# Patient Record
Sex: FEMALE | Race: WHITE | NOT HISPANIC OR LATINO | URBAN - METROPOLITAN AREA
[De-identification: names, ages, dates, MRNs, and addresses within clinical notes are randomized per-mention and may not be internally consistent; named-entity substitution may affect disease eponyms.]

---

## 2017-12-22 ENCOUNTER — EMERGENCY (EMERGENCY)
Facility: HOSPITAL | Age: 27
LOS: 0 days | Discharge: HOME | End: 2017-12-22
Attending: EMERGENCY MEDICINE | Admitting: EMERGENCY MEDICINE
Payer: MEDICAID

## 2017-12-22 ENCOUNTER — RESULT REVIEW (OUTPATIENT)
Age: 27
End: 2017-12-22

## 2017-12-22 VITALS
OXYGEN SATURATION: 100 % | SYSTOLIC BLOOD PRESSURE: 123 MMHG | RESPIRATION RATE: 17 BRPM | DIASTOLIC BLOOD PRESSURE: 65 MMHG | HEART RATE: 91 BPM

## 2017-12-22 VITALS
DIASTOLIC BLOOD PRESSURE: 69 MMHG | HEART RATE: 84 BPM | SYSTOLIC BLOOD PRESSURE: 112 MMHG | TEMPERATURE: 98 F | RESPIRATION RATE: 16 BRPM | OXYGEN SATURATION: 100 % | HEIGHT: 66 IN | WEIGHT: 139.99 LBS

## 2017-12-22 DIAGNOSIS — O03.9 COMPLETE OR UNSPECIFIED SPONTANEOUS ABORTION WITHOUT COMPLICATION: ICD-10-CM

## 2017-12-22 LAB
ABO RH CONFIRMATION: SIGNIFICANT CHANGE UP
ALBUMIN SERPL ELPH-MCNC: 3.4 G/DL — SIGNIFICANT CHANGE UP (ref 3.3–5)
ALP SERPL-CCNC: 52 U/L — SIGNIFICANT CHANGE UP (ref 40–120)
ALT FLD-CCNC: 21 U/L — SIGNIFICANT CHANGE UP (ref 12–78)
ANION GAP SERPL CALC-SCNC: 7 MMOL/L — SIGNIFICANT CHANGE UP (ref 5–17)
APPEARANCE UR: CLEAR — SIGNIFICANT CHANGE UP
APTT BLD: 31.9 SEC — SIGNIFICANT CHANGE UP (ref 27.5–37.4)
AST SERPL-CCNC: 20 U/L — SIGNIFICANT CHANGE UP (ref 15–37)
BACTERIA # UR AUTO: (no result)
BASOPHILS # BLD AUTO: 0.1 K/UL — SIGNIFICANT CHANGE UP (ref 0–0.2)
BASOPHILS NFR BLD AUTO: 1.2 % — SIGNIFICANT CHANGE UP (ref 0–2)
BILIRUB SERPL-MCNC: 0.2 MG/DL — SIGNIFICANT CHANGE UP (ref 0.2–1.2)
BILIRUB UR-MCNC: NEGATIVE — SIGNIFICANT CHANGE UP
BLD GP AB SCN SERPL QL: SIGNIFICANT CHANGE UP
BUN SERPL-MCNC: 11 MG/DL — SIGNIFICANT CHANGE UP (ref 7–23)
CALCIUM SERPL-MCNC: 9.3 MG/DL — SIGNIFICANT CHANGE UP (ref 8.5–10.1)
CHLORIDE SERPL-SCNC: 105 MMOL/L — SIGNIFICANT CHANGE UP (ref 96–108)
CO2 SERPL-SCNC: 28 MMOL/L — SIGNIFICANT CHANGE UP (ref 22–31)
COLOR SPEC: YELLOW — SIGNIFICANT CHANGE UP
CREAT SERPL-MCNC: 0.65 MG/DL — SIGNIFICANT CHANGE UP (ref 0.5–1.3)
DIFF PNL FLD: (no result)
EOSINOPHIL # BLD AUTO: 0.3 K/UL — SIGNIFICANT CHANGE UP (ref 0–0.5)
EOSINOPHIL NFR BLD AUTO: 3.2 % — SIGNIFICANT CHANGE UP (ref 0–6)
EPI CELLS # UR: (no result)
GLUCOSE SERPL-MCNC: 80 MG/DL — SIGNIFICANT CHANGE UP (ref 70–99)
GLUCOSE UR QL: NEGATIVE MG/DL — SIGNIFICANT CHANGE UP
HCG SERPL-ACNC: 4921 MIU/ML — SIGNIFICANT CHANGE UP
HCT VFR BLD CALC: 39.4 % — SIGNIFICANT CHANGE UP (ref 34.5–45)
HGB BLD-MCNC: 13.1 G/DL — SIGNIFICANT CHANGE UP (ref 11.5–15.5)
INR BLD: 1 RATIO — SIGNIFICANT CHANGE UP (ref 0.88–1.16)
KETONES UR-MCNC: NEGATIVE — SIGNIFICANT CHANGE UP
LEUKOCYTE ESTERASE UR-ACNC: (no result)
LYMPHOCYTES # BLD AUTO: 1.8 K/UL — SIGNIFICANT CHANGE UP (ref 1–3.3)
LYMPHOCYTES # BLD AUTO: 21.8 % — SIGNIFICANT CHANGE UP (ref 13–44)
MCHC RBC-ENTMCNC: 31.1 PG — SIGNIFICANT CHANGE UP (ref 27–34)
MCHC RBC-ENTMCNC: 33.3 GM/DL — SIGNIFICANT CHANGE UP (ref 32–36)
MCV RBC AUTO: 93.4 FL — SIGNIFICANT CHANGE UP (ref 80–100)
MONOCYTES # BLD AUTO: 0.8 K/UL — SIGNIFICANT CHANGE UP (ref 0–0.9)
MONOCYTES NFR BLD AUTO: 9.4 % — SIGNIFICANT CHANGE UP (ref 2–14)
NEUTROPHILS # BLD AUTO: 5.4 K/UL — SIGNIFICANT CHANGE UP (ref 1.8–7.4)
NEUTROPHILS NFR BLD AUTO: 64.4 % — SIGNIFICANT CHANGE UP (ref 43–77)
NITRITE UR-MCNC: NEGATIVE — SIGNIFICANT CHANGE UP
PH UR: 7 — SIGNIFICANT CHANGE UP (ref 5–8)
PLATELET # BLD AUTO: 202 K/UL — SIGNIFICANT CHANGE UP (ref 150–400)
POTASSIUM SERPL-MCNC: 4.4 MMOL/L — SIGNIFICANT CHANGE UP (ref 3.5–5.3)
POTASSIUM SERPL-SCNC: 4.4 MMOL/L — SIGNIFICANT CHANGE UP (ref 3.5–5.3)
PROT SERPL-MCNC: 6.8 GM/DL — SIGNIFICANT CHANGE UP (ref 6–8.3)
PROT UR-MCNC: NEGATIVE MG/DL — SIGNIFICANT CHANGE UP
PROTHROM AB SERPL-ACNC: 10.8 SEC — SIGNIFICANT CHANGE UP (ref 9.8–12.7)
RBC # BLD: 4.22 M/UL — SIGNIFICANT CHANGE UP (ref 3.8–5.2)
RBC # FLD: 11.2 % — SIGNIFICANT CHANGE UP (ref 10.3–14.5)
RBC CASTS # UR COMP ASSIST: >50 /HPF (ref 0–4)
SODIUM SERPL-SCNC: 140 MMOL/L — SIGNIFICANT CHANGE UP (ref 135–145)
SP GR SPEC: 1.01 — SIGNIFICANT CHANGE UP (ref 1.01–1.02)
TYPE + AB SCN PNL BLD: SIGNIFICANT CHANGE UP
UROBILINOGEN FLD QL: NEGATIVE MG/DL — SIGNIFICANT CHANGE UP
WBC # BLD: 8.3 K/UL — SIGNIFICANT CHANGE UP (ref 3.8–10.5)
WBC # FLD AUTO: 8.3 K/UL — SIGNIFICANT CHANGE UP (ref 3.8–10.5)
WBC UR QL: SIGNIFICANT CHANGE UP

## 2017-12-22 PROCEDURE — 99285 EMERGENCY DEPT VISIT HI MDM: CPT

## 2017-12-22 PROCEDURE — 88305 TISSUE EXAM BY PATHOLOGIST: CPT | Mod: 26

## 2017-12-22 PROCEDURE — 76856 US EXAM PELVIC COMPLETE: CPT | Mod: 26

## 2017-12-22 RX ORDER — KETOROLAC TROMETHAMINE 30 MG/ML
15 SYRINGE (ML) INJECTION ONCE
Qty: 0 | Refills: 0 | Status: DISCONTINUED | OUTPATIENT
Start: 2017-12-22 | End: 2017-12-22

## 2017-12-22 RX ORDER — ALBUTEROL 90 UG/1
1 AEROSOL, METERED ORAL ONCE
Qty: 0 | Refills: 0 | Status: DISCONTINUED | OUTPATIENT
Start: 2017-12-22 | End: 2017-12-22

## 2017-12-22 RX ORDER — SODIUM CHLORIDE 9 MG/ML
1000 INJECTION INTRAMUSCULAR; INTRAVENOUS; SUBCUTANEOUS ONCE
Qty: 0 | Refills: 0 | Status: COMPLETED | OUTPATIENT
Start: 2017-12-22 | End: 2017-12-22

## 2017-12-22 RX ADMIN — Medication 15 MILLIGRAM(S): at 15:34

## 2017-12-22 RX ADMIN — SODIUM CHLORIDE 2000 MILLILITER(S): 9 INJECTION INTRAMUSCULAR; INTRAVENOUS; SUBCUTANEOUS at 12:16

## 2017-12-22 NOTE — CONSULT NOTE ADULT - PROBLEM SELECTOR RECOMMENDATION 9
-Outpatient ultrasound confirmed 6 wk intrauterine pregnancy  -STAT Pelvic ultrasound showed No evidence of intrauterine gestation. Endometrial lining is slightly thickened.  -Speculum exam showed minimal bleeding with products of conception noted in the internal os. Specimen was obtained and will be sent to pathology. Follow up pathology report.   -Cytotec 800 mcg placed vaginally  -Recommend Toradol IM for cramping  -Patient will follow up outpatient for an ultrasound within 1-2 weeks  -Plan discussed with ED attending, Dr. Wade -Outpatient ultrasound confirmed intrauterine pregnancy  -STAT Pelvic ultrasound showed No evidence of intrauterine gestation. Endometrial lining is slightly thickened.  -Speculum exam showed minimal bleeding with products of conception noted in the internal os. Specimen was obtained and will be sent to pathology. Follow up pathology report.   -Cytotec 800 mcg placed vaginally  -Recommend Toradol IM for cramping  -Patient will follow up outpatient for an ultrasound within 1-2 weeks  -Plan discussed with ED attending, Dr. Wade

## 2017-12-22 NOTE — ED ADULT TRIAGE NOTE - CHIEF COMPLAINT QUOTE
Pt. to the ED BIBA C/O Vaginal Bleeding- Pt. states she is bleeding heavily w clots and abdominal - Hx. of recent kidney infection

## 2017-12-22 NOTE — CONSULT NOTE ADULT - SUBJECTIVE AND OBJECTIVE BOX
26 yo F,  with no significant PMHx presented to the ED with vaginal bleeding x 1 day. The patient states that she was 6 weeks pregnant with an ultrasound confirmation performed by [facility] a few days prior. The patient is complaining of abdominal cramping and vaginal bleeding with clots. Denies weakness, headache, dizziness, chest pain, palpitations, SOB, N/V/D/C, hematuria, and melena. The patient has not established with an Ob. Admits to smoking cigarettes during pregnancy. Denies EtOH and recreational drugs. 26 yo F,  with no significant PMHx presented to the ED with vaginal bleeding x 1 day. The patient states that she was 6 weeks pregnant with an ultrasound confirmation performed by [facility] a few days prior. The patient is complaining of abdominal cramping and vaginal bleeding with clots. Denies weakness, headache, dizziness, chest pain, palpitations, SOB, N/V/D/C, hematuria, and melena. The patient has not established with an Ob. Admits to smoking cigarettes during pregnancy. Denies EtOH and recreational drugs.     REVIEW OF SYSTEMS:  CONSTITUTIONAL: No fever  RESPIRATORY: No cough, wheezing, chills or hemoptysis; No shortness of breath  CARDIOVASCULAR: No chest pain, palpitations, dizziness, or leg swelling  GASTROINTESTINAL: No pigastric pain. No nausea, vomiting, or hematemesis; No diarrhea or constipation. No melena or hematochezia; Admits to lower abdominal cramping  GENITOURINARY: No dysuria, frequency, hematuria, or incontinence  NEUROLOGICAL: No headaches    Vital Signs Last 24 Hrs  T(C): 36.4 (22 Dec 2017 11:06), Max: 36.4 (22 Dec 2017 11:06)  T(F): 97.6 (22 Dec 2017 11:06), Max: 97.6 (22 Dec 2017 11:06)  HR: 84 (22 Dec 2017 11:06) (84 - 84)  BP: 112/69 (22 Dec 2017 11:06) (112/69 - 112/69)  BP(mean): --  RR: 16 (22 Dec 2017 11:06) (16 - 16)  SpO2: 100% (22 Dec 2017 11:06) (100% - 100%)    PHYSICAL EXAM:  GENERAL: NAD, well-groomed, well-developed  HEAD:  Atraumatic, Normocephalic  ENMT: Moist mucous membranes  NERVOUS SYSTEM:  Alert & Oriented X3, Good concentration  CHEST/LUNG: Clear to auscultation bilaterally; No rales, rhonchi, wheezing, or rubs  HEART: Regular rate and rhythm; No murmurs, rubs, or gallops  ABDOMEN: Soft, Nontender, Nondistended; Bowel sounds present  EXTREMITIES:  2+ Peripheral Pulses, No clubbing, cyanosis, or edema  PELVIC: Speculum exam performed. Minimal bleeding, POC found in internal os    < from: US Pelvis Complete (17 @ 13:15) >  The uterus is 9.67 x 5.29 x 5.48 cm in diameters. Endometrial stripe is   minimally thickened, 14 mm in thickness. There is no evidence of   intrauterine gestation.  The cervix is unremarkable.    The right ovary is3 x 0.9 x 2.2 cm in diameters and shows normal flow by   Doppler.  The left ovary is 2.7 x 1.8 x 2.2 cm in diameters and shows normal flow   by Doppler.    No free pelvic fluid.    Impression: No evidence of intrauterine gestation. Endometrial liningis   slightly thickened.  The ovaries are unremarkable.  Ectopic pregnancy is not entirely excluded  and clinical correlation is   recommended.      A/P: 28 yo F,  with no significant PMHx presented to the ED with vaginal bleeding x 1 day. The patient states that she was 6 weeks pregnant with an ultrasound confirmation performed by [facility] a few days prior. The patient is complaining of abdominal cramping and vaginal bleeding with clots. Denies weakness, headache, dizziness, chest pain, palpitations, SOB, N/V/D/C, hematuria, and melena. The patient has not established with an Ob. Admits to smoking cigarettes during pregnancy. Denies EtOH and recreational drugs.     REVIEW OF SYSTEMS:  CONSTITUTIONAL: No fever  RESPIRATORY: No cough, wheezing, chills or hemoptysis; No shortness of breath  CARDIOVASCULAR: No chest pain, palpitations, dizziness, or leg swelling  GASTROINTESTINAL: No pigastric pain. No nausea, vomiting, or hematemesis; No diarrhea or constipation. No melena or hematochezia; Admits to lower abdominal cramping  GENITOURINARY: No dysuria, frequency, hematuria, or incontinence  NEUROLOGICAL: No headaches    Vital Signs Last 24 Hrs  T(C): 36.4 (22 Dec 2017 11:06), Max: 36.4 (22 Dec 2017 11:06)  T(F): 97.6 (22 Dec 2017 11:06), Max: 97.6 (22 Dec 2017 11:06)  HR: 84 (22 Dec 2017 11:06) (84 - 84)  BP: 112/69 (22 Dec 2017 11:06) (112/69 - 112/69)  BP(mean): --  RR: 16 (22 Dec 2017 11:06) (16 - 16)  SpO2: 100% (22 Dec 2017 11:06) (100% - 100%)    PHYSICAL EXAM:  GENERAL: NAD, well-groomed, well-developed  HEAD:  Atraumatic, Normocephalic  ENMT: Moist mucous membranes  NERVOUS SYSTEM:  Alert & Oriented X3, Good concentration  CHEST/LUNG: Clear to auscultation bilaterally; No rales, rhonchi, wheezing, or rubs  HEART: Regular rate and rhythm; No murmurs, rubs, or gallops  ABDOMEN: Soft, Nontender, Nondistended; Bowel sounds present  EXTREMITIES:  2+ Peripheral Pulses, No clubbing, cyanosis, or edema  PELVIC: Speculum exam performed. Minimal bleeding, POC found in internal os    < from: US Pelvis Complete (17 @ 13:15) >  The uterus is 9.67 x 5.29 x 5.48 cm in diameters. Endometrial stripe is   minimally thickened, 14 mm in thickness. There is no evidence of   intrauterine gestation.  The cervix is unremarkable.    The right ovary is3 x 0.9 x 2.2 cm in diameters and shows normal flow by   Doppler.  The left ovary is 2.7 x 1.8 x 2.2 cm in diameters and shows normal flow   by Doppler.    No free pelvic fluid.    Impression: No evidence of intrauterine gestation. Endometrial liningis   slightly thickened.  The ovaries are unremarkable.  Ectopic pregnancy is not entirely excluded  and clinical correlation is   recommended.

## 2017-12-22 NOTE — CONSULT NOTE ADULT - ATTENDING COMMENTS
ATTENDING NOTE  Patient was seen and examined. 28 yo P2 s/p SAB at 6 weeks patient stable for discharge.   I agree with  above resident assessment and plan.   BLAINE Armenta MD

## 2017-12-22 NOTE — ED ADULT NURSE NOTE - CAS EDN DISCHARGE ASSESSMENT
Alert and oriented to person, place and time/Dressing clean and dry/No adverse reaction to first time med in ED/Awake/Symptoms improved

## 2017-12-22 NOTE — ED ADULT NURSE NOTE - OBJECTIVE STATEMENT
Pt. c/o of vaginal bleeding that started at 10:30am today. Pt. states she was passing larger clots of blood. Pt. states hx of 1 miscarriage and two living children. Pt. c/o of cramping 2/10 with fatigue. Pt. denies CP, HA, blurry vision,  lightheadedness, tingling, numbness, n/v/d, bloody stool, blood in urine.

## 2017-12-22 NOTE — CONSULT NOTE ADULT - ASSESSMENT
26 yo F,  with no significant PMHx presented to the ED with vaginal bleeding x 1 day. 28 yo F,  with no significant PMHx presented to the ED with vaginal bleeding x 1 day 2/2 to spontaneous  at 6 weeks gestation.

## 2017-12-22 NOTE — ED PROVIDER NOTE - MEDICAL DECISION MAKING DETAILS
28 y/o female, 6 weeks pregnant with vaginal bleeding, plan for IV fluids, labs, pelvic sono, reassess.

## 2017-12-22 NOTE — ED PROVIDER NOTE - OBJECTIVE STATEMENT
28 y/o female with PMHx of [id number] and UTI's in the past, currently 6 weeks pregnant, presenting to the ED BIBA for evaluation of vaginal bleeding since 10:30 AM today. Pt states that she noticed bleeding this morning and went to [facility]. She then changed her tampon with large clot and decided to call EMS. Pt has mild cramping with sharp pain in abd. Pt reports 1 previous miscarriage that feels dissimilar to current sx. Pt is awake, alert, oriented, in no apparent distress, and able to provide full hx, denies fever, N/V/D, CP, SOB, myalgias, or any other acute c/o.

## 2017-12-23 LAB
CULTURE RESULTS: NO GROWTH — SIGNIFICANT CHANGE UP
SPECIMEN SOURCE: SIGNIFICANT CHANGE UP

## 2017-12-29 LAB — SURGICAL PATHOLOGY FINAL REPORT - CH: SIGNIFICANT CHANGE UP

## 2018-01-02 PROBLEM — Z00.00 ENCOUNTER FOR PREVENTIVE HEALTH EXAMINATION: Status: ACTIVE | Noted: 2018-01-02

## 2023-01-24 NOTE — ED ADULT NURSE NOTE - TEMPLATE LIST FOR HEAD TO TOE ASSESSMENT
I TRIED TO CALL [facility] TO SCHEDULE HER VENOFER INFUSIONS AND HAD TO LEAVE A MESSAGE TO CALL THE OFFICE TO SCHEDULE. OB/GYN